# Patient Record
Sex: FEMALE | Race: WHITE
[De-identification: names, ages, dates, MRNs, and addresses within clinical notes are randomized per-mention and may not be internally consistent; named-entity substitution may affect disease eponyms.]

---

## 2020-12-21 PROBLEM — Z00.00 ENCOUNTER FOR PREVENTIVE HEALTH EXAMINATION: Status: ACTIVE | Noted: 2020-12-21

## 2020-12-22 ENCOUNTER — APPOINTMENT (OUTPATIENT)
Dept: ENDOCRINOLOGY | Facility: CLINIC | Age: 30
End: 2020-12-22
Payer: COMMERCIAL

## 2020-12-22 PROCEDURE — 99204 OFFICE O/P NEW MOD 45 MIN: CPT | Mod: 25,95

## 2020-12-25 NOTE — ASSESSMENT
[FreeTextEntry1] : 30 year old female with past medical history of Diabetes Mellitus Type 1 who presents for management of her diabetes\par \par 1. DM 1- controlled, uncomplicated\par Patient counseled on the importance of diabetic control and complications. Patient's diet and the necessary changes discussed. Reviewed over CGMs. Reviewed over glycemic goals. Patient has too many episodes of hypoglycemia. She is at risk for seizures and coma. I suggested that we get a CGM to alarm her when she has hypoglycemia. Also reviewed Glucagon and hypoglycemia treatment.\par \par Cont Humalog 6-8 TID  (CR:7)\par Cont Lantus 18 units QHS\par Cont Ozempic 1 mg Qweekly \par Check fsg QID\par Cont diabetic diet\par Cont exercise\par Will refer to CDE/RD\par will retrieve recent labs\par Opthalmology Visit up to date\par Foot Exam deferred\par \par Follow up in 3 months\par \par \par

## 2020-12-25 NOTE — HISTORY OF PRESENT ILLNESS
[Home] : at home, [unfilled] , at the time of the visit. [Medical Office: ([facility])___] : at the medical office located in  [Verbal consent obtained from patient] : the patient, [unfilled] [FreeTextEntry1] : Ms. SANDRA SKELTON  is a 30 year old female with past medical history of Diabetes Mellitus Type 1 who presents for management of her diabetes. Patient was managed by another endocrinologist for her diabetes. She is in generally good control. She denies any episodes of DKA or hospitalizations. She did have an incident where she had a possible seizure but does not have any memory of it and never sought emergency care. Patient denies any history of diabetic retinopathy, nephropathy or neuropathy. He denies any blurry vision, polyuria, polydipsia and numbness/tingling in the extremities.\par \par \par POCT Glucose: mg/dL\par POCT Hga1c: %\par \par \par Diabetes first diagnosed: 2013\par Type: 2\par \par Current diabetic regimen:\par Humalog 6-8 TID  (CR:7)\par Lantus 18 units QHS\par Ozempic 1 mg Qweekly \par Other relevant medications:\par \par Self monitoring blood glucose : 4x times per day:\par \par SMBG:\par Pre-breakfast: \par Pre-lunch: \par Pre-dinner: 100-140\par bedtime: \par \par Hypoglycemia: after dinner very frequently. Has hypoglycemic unawareness. Symptoms appear when fsg<40\par \par Diet:\par Breakfast: protein powder, soymilk and fruit\par Lunch:\par Dinner: meat with sides\par Snacks: cookies \par \par Exercise:\par \par Urine micro:NA\par lipid profile:NA\par last hba1c:NA\par eye exam: 4/2020\par diabetic foot exam/podiatry:NA\par \par \par \par \par

## 2021-01-05 ENCOUNTER — TRANSCRIPTION ENCOUNTER (OUTPATIENT)
Age: 31
End: 2021-01-05

## 2021-04-23 ENCOUNTER — APPOINTMENT (OUTPATIENT)
Dept: ULTRASOUND IMAGING | Facility: CLINIC | Age: 31
End: 2021-04-23
Payer: COMMERCIAL

## 2021-04-23 ENCOUNTER — APPOINTMENT (OUTPATIENT)
Dept: ENDOCRINOLOGY | Facility: CLINIC | Age: 31
End: 2021-04-23
Payer: COMMERCIAL

## 2021-04-23 ENCOUNTER — OUTPATIENT (OUTPATIENT)
Dept: OUTPATIENT SERVICES | Facility: HOSPITAL | Age: 31
LOS: 1 days | End: 2021-04-23

## 2021-04-23 VITALS
HEART RATE: 91 BPM | WEIGHT: 160 LBS | HEIGHT: 63 IN | DIASTOLIC BLOOD PRESSURE: 82 MMHG | BODY MASS INDEX: 28.35 KG/M2 | OXYGEN SATURATION: 99 % | SYSTOLIC BLOOD PRESSURE: 127 MMHG

## 2021-04-23 DIAGNOSIS — E04.9 NONTOXIC GOITER, UNSPECIFIED: ICD-10-CM

## 2021-04-23 PROCEDURE — 99214 OFFICE O/P EST MOD 30 MIN: CPT | Mod: 25

## 2021-04-23 PROCEDURE — 99072 ADDL SUPL MATRL&STAF TM PHE: CPT

## 2021-04-23 PROCEDURE — 76536 US EXAM OF HEAD AND NECK: CPT | Mod: 26

## 2021-04-26 NOTE — ASSESSMENT
[FreeTextEntry1] : 30 year old female with past medical history of Diabetes Mellitus Type 1 who presents for management of her diabetes\par \par 1. DM 1- controlled, uncomplicated\par Patient counseled on the importance of diabetic control and complications. Patient's diet and the necessary changes discussed. Reviewed over CGMs. Reviewed over glycemic goals. Will check for hypothyroidism and celiac disease given the change in her insulin requirements\par \par Cont Humalog 10-12 TID  (CR:7)\par Cont Lantus 22 units QHS\par Cont Ozempic 1 mg Qweekly \par Check fsg QID\par Cont diabetic diet\par Cont exercise\par labs today\par Opthalmology Visit up to date\par Foot Exam up to date\par \par Follow up in 3 months\par \par \par

## 2021-04-26 NOTE — HISTORY OF PRESENT ILLNESS
[FreeTextEntry1] : Ms. SANDRA SKELTON  is a 30 year old female with past medical history of Diabetes Mellitus Type 1 who presents for management of her diabetes. Patient has been having hyperglycemia and had to increase her insulin. She has also had 2 hypoglycemic episodes. One in which she became unconscious and EMS was called.\par \par \par POCT Glucose: mg/dL\par POCT Hga1c: %\par \par \par Diabetes first diagnosed: 2013\par Type: 1\par \par Current diabetic regimen:\par Humalog 10-12 TID  (CR:7)\par Lantus 22 units QHS\par Ozempic 1 mg Qweekly \par Other relevant medications:\par \par Self monitoring blood glucose : 4x times per day:\par \par SMBG:\par Pre-breakfast: 120-140\par post-meals: 100-200s\par \par Hypoglycemia: after dinner very frequently. Has hypoglycemic unawareness. Symptoms appear when fsg<40\par \par Diet:\par Breakfast: protein powder, soymilk and fruit\par Lunch:\par Dinner: meat with sides\par Snacks: cookies \par \par Exercise:\par \par Urine micro:NA\par lipid profile:NA\par last hba1c:NA\par eye exam: 4/2020\par diabetic foot exam/podiatry:NA\par \par \par \par \par

## 2021-05-02 LAB
CREAT SPEC-SCNC: 106 MG/DL
ENDOMYSIUM IGA SER QL: NEGATIVE
ENDOMYSIUM IGA TITR SER: NORMAL
GLIADIN IGA SER QL: 5.7 UNITS
GLIADIN IGG SER QL: <5 UNITS
GLIADIN PEPTIDE IGA SER-ACNC: NEGATIVE
GLIADIN PEPTIDE IGG SER-ACNC: NEGATIVE
MICROALBUMIN 24H UR DL<=1MG/L-MCNC: 1.4 MG/DL
MICROALBUMIN/CREAT 24H UR-RTO: 13 MG/G
THYROGLOB AB SERPL-ACNC: 29.5 IU/ML
THYROGLOB SERPL-MCNC: 60.8 NG/ML
THYROPEROXIDASE AB SERPL IA-ACNC: 261 IU/ML
TTG IGA SER IA-ACNC: <1.2 U/ML
TTG IGA SER-ACNC: NEGATIVE
TTG IGG SER IA-ACNC: 4.9 U/ML
TTG IGG SER IA-ACNC: NEGATIVE

## 2021-05-05 ENCOUNTER — APPOINTMENT (OUTPATIENT)
Dept: ENDOCRINOLOGY | Facility: CLINIC | Age: 31
End: 2021-05-05
Payer: COMMERCIAL

## 2021-05-05 VITALS — WEIGHT: 165 LBS | HEIGHT: 63 IN | BODY MASS INDEX: 29.23 KG/M2

## 2021-05-05 PROCEDURE — 99072 ADDL SUPL MATRL&STAF TM PHE: CPT

## 2021-05-05 PROCEDURE — 95251 CONT GLUC MNTR ANALYSIS I&R: CPT

## 2021-05-05 PROCEDURE — G0108 DIAB MANAGE TRN  PER INDIV: CPT

## 2021-06-02 ENCOUNTER — APPOINTMENT (OUTPATIENT)
Dept: ENDOCRINOLOGY | Facility: CLINIC | Age: 31
End: 2021-06-02
Payer: COMMERCIAL

## 2021-06-02 VITALS
OXYGEN SATURATION: 99 % | HEIGHT: 63 IN | WEIGHT: 165 LBS | SYSTOLIC BLOOD PRESSURE: 109 MMHG | BODY MASS INDEX: 29.23 KG/M2 | DIASTOLIC BLOOD PRESSURE: 78 MMHG | HEART RATE: 97 BPM

## 2021-06-02 PROCEDURE — 99214 OFFICE O/P EST MOD 30 MIN: CPT

## 2021-06-02 PROCEDURE — 82962 GLUCOSE BLOOD TEST: CPT

## 2021-06-03 NOTE — HISTORY OF PRESENT ILLNESS
[FreeTextEntry1] : Ms. SANDRA SKELTON  is a 30 year old female with past medical history of Diabetes Mellitus Type 1 who presents for management of her diabetes. \par \par \par POCT Glucose: 165 mg/dL\par POCT Hga1c: %\par \par \par Diabetes first diagnosed: 2013\par Type: 1\par \par Current diabetic regimen:\par Humalog 6-8 TID  (CR:7)\par Lantus 18 units QHS\par Ozempic 1 mg Qweekly \par Other relevant medications:\par \par Self monitoring blood glucose :Freestyle Angely\par \par AGP Report: \par (5/6/21-6/2/21)\par High: 10%\par Target (): 68%\par Low: 15%\par Very low: 6%\par \par Hypoglycemia: after dinner very frequently. Has hypoglycemic unawareness. Symptoms appear when fsg<40\par \par Diet:\par Breakfast: protein powder, soymilk and fruit\par Lunch:\par Dinner: meat with sides\par Snacks: cookies \par \par Exercise:\par \par Urine micro:wnl (4/2021)\par lipid profile:LDL 67 (4/2021)\par last hba1c:5.6% (4/2021)\par eye exam: 4/2020\par diabetic foot exam/podiatry: in office (4/2021)\par \par \par \par \par

## 2021-06-03 NOTE — ASSESSMENT
[FreeTextEntry1] : 30 year old female with past medical history of Diabetes Mellitus Type 1 who presents for management of her diabetes\par \par 1. DM 1- controlled, uncomplicated\par Patient counseled on the importance of diabetic control and complications. Patient's diet and the necessary changes discussed. Reviewed over CGMs. Reviewed over glycemic goals. Patient still having a lot of hypoglycemia\par \par Switch to Lyumjev 3-6 units with meals\par Dec to Lantus 16 units\par Cont Ozempic 1 mg Qweekly \par Check fsg QID\par Cont diabetic diet\par Cont exercise\par labs UTD\par Opthalmology Visit up to date\par Foot Exam up to date\par \par 2. Hashimotos\par Euthyroid\par Will cont to monitor\par Follow up in 3 months\par \par \par

## 2021-07-02 ENCOUNTER — NON-APPOINTMENT (OUTPATIENT)
Age: 31
End: 2021-07-02

## 2021-07-19 RX ORDER — INSULIN GLARGINE 100 [IU]/ML
100 INJECTION, SOLUTION SUBCUTANEOUS
Qty: 1 | Refills: 3 | Status: ACTIVE | COMMUNITY
Start: 2021-07-19 | End: 1900-01-01

## 2021-08-24 ENCOUNTER — RESULT REVIEW (OUTPATIENT)
Age: 31
End: 2021-08-24

## 2021-09-15 ENCOUNTER — APPOINTMENT (OUTPATIENT)
Dept: ENDOCRINOLOGY | Facility: CLINIC | Age: 31
End: 2021-09-15
Payer: COMMERCIAL

## 2021-09-15 VITALS
BODY MASS INDEX: 28.88 KG/M2 | SYSTOLIC BLOOD PRESSURE: 112 MMHG | WEIGHT: 163 LBS | HEART RATE: 82 BPM | HEIGHT: 63 IN | DIASTOLIC BLOOD PRESSURE: 72 MMHG | OXYGEN SATURATION: 100 %

## 2021-09-15 PROCEDURE — 99214 OFFICE O/P EST MOD 30 MIN: CPT | Mod: 25

## 2021-09-15 PROCEDURE — 95251 CONT GLUC MNTR ANALYSIS I&R: CPT

## 2021-09-15 RX ORDER — BLOOD-GLUCOSE,RECEIVER,CONT
EACH MISCELLANEOUS
Qty: 1 | Refills: 0 | Status: DISCONTINUED | COMMUNITY
Start: 2021-07-02 | End: 2021-09-15

## 2021-09-17 NOTE — HISTORY OF PRESENT ILLNESS
[FreeTextEntry1] : Ms. SANDRA SKELTON  is a 30 year old female with past medical history of Diabetes Mellitus Type 1 who presents for management of her diabetes. Patient has infection of her breast that is not responding to antibiotics. She also was found to have an abnormal pap smear and is following with Gyn.\par \par \par POCT Glucose: 165 mg/dL\par POCT Hga1c: %\par \par \par Diabetes first diagnosed: 2013\par Type: 1\par \par Current diabetic regimen:\par Humalog 6-8 TID  (CR:7)\par Lantus 18 units QHS\par Ozempic 1 mg Qweekly \par Lyumjev (did not tolerate)\par Other relevant medications:\par \par Self monitoring blood glucose :Freestyle Angely\par \par AGP Report: \par (6/12/21-9/9/21)\par High: 18%\par Target (): 70.3%\par Low: 11.6%\par Very low: 3.6%\par \par Hypoglycemia: after dinner very frequently. Has hypoglycemic unawareness. Symptoms appear when fsg<40\par \par Diet:\par Breakfast: protein powder, soymilk and fruit\par Lunch:\par Dinner: meat with sides\par Snacks: cookies \par \par Exercise:\par \par Urine micro:wnl (4/2021)\par lipid profile:LDL 67 (4/2021)\par last hba1c:5.6% (4/2021)\par eye exam: 4/2020\par diabetic foot exam/podiatry: in office (4/2021)\par \par \par \par \par

## 2021-09-17 NOTE — ASSESSMENT
[FreeTextEntry1] : 30 year old female with past medical history of Diabetes Mellitus Type 1 who presents for management of her diabetes\par \par 1. DM 1- controlled, uncomplicated\par Patient counseled on the importance of diabetic control and complications. Patient's diet and the necessary changes discussed. Reviewed over CGMs. Reviewed over glycemic goals. Patient still having a lot of hypoglycemia\par \par Cont Humalog 6-8 units with meals\par Dec to Lantus 16 units\par Switch from Ozempic to Trulicity\par Check fsg QID\par Cont diabetic diet\par Cont exercise\par labs UTD\par Opthalmology Visit up to date\par Foot Exam up to date\par \par 2. Hashimotos\par Euthyroid\par Will cont to monitor\par \par Follow up in 3 months\par \par \par

## 2021-10-03 ENCOUNTER — TRANSCRIPTION ENCOUNTER (OUTPATIENT)
Age: 31
End: 2021-10-03

## 2021-10-04 ENCOUNTER — NON-APPOINTMENT (OUTPATIENT)
Age: 31
End: 2021-10-04

## 2021-10-06 RX ORDER — PEN NEEDLE, DIABETIC 32GX 5/32"
32G X 4 MM NEEDLE, DISPOSABLE MISCELLANEOUS
Qty: 360 | Refills: 2 | Status: ACTIVE | COMMUNITY
Start: 2021-10-06 | End: 1900-01-01

## 2021-12-22 ENCOUNTER — APPOINTMENT (OUTPATIENT)
Dept: ENDOCRINOLOGY | Facility: CLINIC | Age: 31
End: 2021-12-22
Payer: COMMERCIAL

## 2021-12-22 ENCOUNTER — RESULT CHARGE (OUTPATIENT)
Age: 31
End: 2021-12-22

## 2021-12-22 VITALS
HEART RATE: 82 BPM | HEIGHT: 63 IN | BODY MASS INDEX: 27.46 KG/M2 | OXYGEN SATURATION: 99 % | WEIGHT: 155 LBS | DIASTOLIC BLOOD PRESSURE: 70 MMHG | SYSTOLIC BLOOD PRESSURE: 113 MMHG

## 2021-12-22 PROCEDURE — 99214 OFFICE O/P EST MOD 30 MIN: CPT | Mod: 25

## 2021-12-22 PROCEDURE — 95251 CONT GLUC MNTR ANALYSIS I&R: CPT

## 2021-12-22 PROCEDURE — 82962 GLUCOSE BLOOD TEST: CPT

## 2021-12-22 PROCEDURE — 83036 HEMOGLOBIN GLYCOSYLATED A1C: CPT | Mod: QW

## 2021-12-22 RX ORDER — INSULIN LISPRO-AABC 100 [IU]/ML
100 INJECTION, SOLUTION SUBCUTANEOUS
Qty: 5 | Refills: 1 | Status: DISCONTINUED | COMMUNITY
Start: 2021-06-02 | End: 2021-12-22

## 2021-12-22 RX ORDER — FLASH GLUCOSE SCANNING READER
EACH MISCELLANEOUS
Qty: 1 | Refills: 0 | Status: DISCONTINUED | COMMUNITY
Start: 2021-04-23 | End: 2021-12-22

## 2021-12-22 RX ORDER — FLASH GLUCOSE SENSOR
KIT MISCELLANEOUS
Qty: 6 | Refills: 2 | Status: DISCONTINUED | COMMUNITY
Start: 2021-04-23 | End: 2021-12-22

## 2021-12-22 NOTE — HISTORY OF PRESENT ILLNESS
[FreeTextEntry1] : Ms. SANDRA SKELTON  is a 31 year old female with past medical history of Diabetes Mellitus Type 1 who presents for management of her diabetes. \par \par \par POCT Glucose: 103 mg/dL\par POCT Hga1c: 5.5%\par \par \par Diabetes first diagnosed: 2013\par Type: 1\par \par Current diabetic regimen:\par Humalog 6-8 TID  (CR:7)\par Lantus 18 units QHS\par Ozempic 1 mg Qweekly \par Lyumjev (did not tolerate)\par Other relevant medications:\par \par Self monitoring blood glucose :Dexcom\par \par AGP Report: \par (9/24/21-12/22/21)\par High: 22.5%\par Target (): 67.6%\par Low: 9.9%\par Very low: 2.5%\par \par Hypoglycemia: after dinner very frequently. Has hypoglycemic unawareness. Symptoms appear when fsg<40\par \par Diet:\par Breakfast: protein powder, soymilk and fruit\par Lunch:\par Dinner: meat with sides\par Snacks: cookies \par \par Exercise:\par \par Urine micro:wnl (4/2021)\par lipid profile:LDL 72 (9/2021), LDL 67 (4/2021)\par last hba1c:5.5% (12/2021), 5.7% (9/2021), 5.6% (4/2021)\par eye exam: 4/2020\par diabetic foot exam/podiatry: in office (4/2021)\par \par \par \par \par

## 2021-12-22 NOTE — ASSESSMENT
[FreeTextEntry1] : 31 year old female with past medical history of Diabetes Mellitus Type 1 who presents for management of her diabetes\par \par 1. DM 1- controlled, uncomplicated\par Patient counseled on the importance of diabetic control and complications. Patient's diet and the necessary changes discussed. Reviewed over CGMs. Reviewed over glycemic goals. Patient still having a lot of hypoglycemia\par \par Cont Humalog 6-8 units with meals\par Dec to Lantus 17 units\par Check fsg QID\par Cont diabetic diet\par Cont exercise\par labs UTD\par Opthalmology Visit needs follow up\par Foot Exam up to date\par \par 2. Hashimotos\par Euthyroid\par Will cont to monitor\par \par Follow up in 3 months\par \par \par

## 2022-03-30 ENCOUNTER — RESULT CHARGE (OUTPATIENT)
Age: 32
End: 2022-03-30

## 2022-03-30 ENCOUNTER — APPOINTMENT (OUTPATIENT)
Dept: ENDOCRINOLOGY | Facility: CLINIC | Age: 32
End: 2022-03-30
Payer: COMMERCIAL

## 2022-03-30 VITALS
BODY MASS INDEX: 27.28 KG/M2 | SYSTOLIC BLOOD PRESSURE: 107 MMHG | WEIGHT: 154 LBS | OXYGEN SATURATION: 97 % | HEART RATE: 80 BPM | DIASTOLIC BLOOD PRESSURE: 71 MMHG

## 2022-03-30 LAB
ALBUMIN SERPL ELPH-MCNC: 4.7 G/DL
ALP BLD-CCNC: 68 U/L
ALT SERPL-CCNC: 16 U/L
ANION GAP SERPL CALC-SCNC: 14 MMOL/L
AST SERPL-CCNC: 19 U/L
BASOPHILS # BLD AUTO: 0.06 K/UL
BASOPHILS NFR BLD AUTO: 1.1 %
BILIRUB SERPL-MCNC: 1.1 MG/DL
BUN SERPL-MCNC: 11 MG/DL
CALCIUM SERPL-MCNC: 9.6 MG/DL
CHLORIDE SERPL-SCNC: 105 MMOL/L
CHOLEST SERPL-MCNC: 198 MG/DL
CO2 SERPL-SCNC: 23 MMOL/L
CREAT SERPL-MCNC: 0.79 MG/DL
CREAT SPEC-SCNC: 160 MG/DL
EGFR: 102 ML/MIN/1.73M2
EOSINOPHIL # BLD AUTO: 0.03 K/UL
EOSINOPHIL NFR BLD AUTO: 0.5 %
ESTIMATED AVERAGE GLUCOSE: 126 MG/DL
FRUCTOSAMINE SERPL-MCNC: 317 UMOL/L
GLUCOSE SERPL-MCNC: 47 MG/DL
HBA1C MFR BLD HPLC: 6 %
HCT VFR BLD CALC: 43.6 %
HDLC SERPL-MCNC: 99 MG/DL
HGB BLD-MCNC: 13.8 G/DL
IMM GRANULOCYTES NFR BLD AUTO: 0.2 %
LDLC SERPL CALC-MCNC: 89 MG/DL
LYMPHOCYTES # BLD AUTO: 2.46 K/UL
LYMPHOCYTES NFR BLD AUTO: 44.9 %
MAN DIFF?: NORMAL
MCHC RBC-ENTMCNC: 29.1 PG
MCHC RBC-ENTMCNC: 31.7 GM/DL
MCV RBC AUTO: 91.8 FL
MICROALBUMIN 24H UR DL<=1MG/L-MCNC: 1.5 MG/DL
MICROALBUMIN/CREAT 24H UR-RTO: 10 MG/G
MONOCYTES # BLD AUTO: 0.46 K/UL
MONOCYTES NFR BLD AUTO: 8.4 %
NEUTROPHILS # BLD AUTO: 2.46 K/UL
NEUTROPHILS NFR BLD AUTO: 44.9 %
NONHDLC SERPL-MCNC: 99 MG/DL
PLATELET # BLD AUTO: 284 K/UL
POTASSIUM SERPL-SCNC: 3.8 MMOL/L
PROT SERPL-MCNC: 7.5 G/DL
RBC # BLD: 4.75 M/UL
RBC # FLD: 12 %
SODIUM SERPL-SCNC: 142 MMOL/L
TRIGL SERPL-MCNC: 49 MG/DL
TSH SERPL-ACNC: 1.81 UIU/ML
WBC # FLD AUTO: 5.48 K/UL

## 2022-03-30 PROCEDURE — 83036 HEMOGLOBIN GLYCOSYLATED A1C: CPT | Mod: QW

## 2022-03-30 PROCEDURE — 95251 CONT GLUC MNTR ANALYSIS I&R: CPT

## 2022-03-30 PROCEDURE — 99214 OFFICE O/P EST MOD 30 MIN: CPT | Mod: 25

## 2022-03-30 PROCEDURE — 82962 GLUCOSE BLOOD TEST: CPT

## 2022-04-01 NOTE — PHYSICAL EXAM
[Alert] : alert [Well Nourished] : well nourished [Healthy Appearance] : healthy appearance [No Acute Distress] : no acute distress [Well Developed] : well developed [No Rash] : no rash [Oriented x3] : oriented to person, place, and time

## 2022-04-01 NOTE — ASSESSMENT
[FreeTextEntry1] : 31 year old female with past medical history of Diabetes Mellitus Type 1 who presents for management of her diabetes\par \par 1. DM 1- controlled, uncomplicated\par Patient counseled on the importance of diabetic control and complications. Patient's diet and the necessary changes discussed. Reviewed over CGMs. Reviewed over glycemic goals. Patient still having a lot of hypoglycemia but significantly improved\par \par Cont Humalog 6-8 units with meals\par Cont Semglee 16 units\par Check fsg QID\par Cont diabetic diet\par Cont exercise\par labs UTD\par Opthalmology Visit needs follow up\par Foot Exam up to date\par \par 2. Hashimotos\par Euthyroid\par Will cont to monitor\par \par Follow up in 3 months\par \par \par

## 2022-04-01 NOTE — HISTORY OF PRESENT ILLNESS
[FreeTextEntry1] : Ms. SANDRA SKELTON  is a 31 year old female with past medical history of Diabetes Mellitus Type 1 who presents for management of her diabetes. Patient has less degree of hypoglycemia and she has less awareness.\par \par \par POCT Glucose:77  mg/dL\par POCT Hga1c: 5.8%\par \par \par Diabetes first diagnosed: 2013\par Type: 1\par \par Current diabetic regimen:\par Humalog 6-8 TID  (CR:7)\par Semglee 16 units QHS\par Ozempic 1 mg Qweekly \par Lyumjev (did not tolerate)\par Other relevant medications:\par \par Self monitoring blood glucose :Dexcom\par \par AGP Report: \par (3/1/22-3/30/22)\par High: 22.6%\par Target (): 70.3%\par Low: 7.1%\par Very low: 1.8%\par \par Hypoglycemia: after dinner very frequently. Has hypoglycemic unawareness. Symptoms appear when fsg<40\par \par Diet:\par Breakfast: protein powder, soymilk and fruit\par Lunch:\par Dinner: meat with sides\par Snacks: cookies \par \par Exercise:\par \par Urine micro:wnl (3/2022), wnl (4/2021)\par lipid profile:LDL 89 (3/2022), LDL 72 (9/2021), LDL 67 (4/2021)\par last hba1c:6% (3/2022), 5.5% (12/2021), 5.7% (9/2021), 5.6% (4/2021)\par eye exam: 4/2020\par diabetic foot exam/podiatry: in office (4/2021)\par \par \par \par \par

## 2022-04-18 ENCOUNTER — RESULT REVIEW (OUTPATIENT)
Age: 32
End: 2022-04-18

## 2022-06-30 ENCOUNTER — APPOINTMENT (OUTPATIENT)
Dept: ENDOCRINOLOGY | Facility: CLINIC | Age: 32
End: 2022-06-30

## 2022-06-30 PROCEDURE — 99442: CPT

## 2022-06-30 RX ORDER — GLUCAGON 3 MG/1
3 POWDER NASAL
Qty: 1 | Refills: 2 | Status: ACTIVE | COMMUNITY
Start: 2020-12-22 | End: 1900-01-01

## 2022-06-30 NOTE — HISTORY OF PRESENT ILLNESS
[Home] : at home, [unfilled] , at the time of the visit. [Medical Office: ([facility])___] : at the medical office located in  [Verbal consent obtained from patient] : the patient, [unfilled] [FreeTextEntry1] : Ms. SANDRA SKELTON  is a 31 year old female with past medical history of Diabetes Mellitus Type 1 who presents for management of her diabetes. Patient has less degree of hypoglycemia and she has less awareness.\par \par \par POCT Glucose:77  mg/dL\par POCT Hga1c: 5.8%\par \par \par Diabetes first diagnosed: 2013\par Type: 1\par \par Current diabetic regimen:\par Humalog 6-8 TID  (CR:7)\par Semglee 18 units QHS\par Ozempic 1 mg Qweekly \par Lyumjev (did not tolerate)\par Other relevant medications:\par \par Self monitoring blood glucose :Dexcom\par \par AGP Report: \par (3/1/22-3/30/22)\par High: 22.6%\par Target (): 70.3%\par Low: 7.1%\par Very low: 1.8%\par \par Hypoglycemia: after dinner very frequently. Has hypoglycemic unawareness. Symptoms appear when fsg<40\par \par Diet:\par Breakfast: protein powder, soymilk and fruit\par Lunch:\par Dinner: meat with sides\par Snacks: cookies \par \par Exercise:\par \par Urine micro:wnl (3/2022), wnl (4/2021)\par lipid profile:LDL 89 (3/2022), LDL 72 (9/2021), LDL 67 (4/2021)\par last hba1c:6% (3/2022), 5.5% (12/2021), 5.7% (9/2021), 5.6% (4/2021)\par eye exam: 4/2020\par diabetic foot exam/podiatry: in office (4/2021)\par \par \par \par \par

## 2022-07-17 ENCOUNTER — RX RENEWAL (OUTPATIENT)
Age: 32
End: 2022-07-17

## 2022-07-20 ENCOUNTER — RX RENEWAL (OUTPATIENT)
Age: 32
End: 2022-07-20

## 2022-10-18 ENCOUNTER — RX RENEWAL (OUTPATIENT)
Age: 32
End: 2022-10-18

## 2022-10-19 ENCOUNTER — APPOINTMENT (OUTPATIENT)
Dept: ENDOCRINOLOGY | Facility: CLINIC | Age: 32
End: 2022-10-19

## 2022-10-19 VITALS
HEIGHT: 63 IN | BODY MASS INDEX: 29.77 KG/M2 | OXYGEN SATURATION: 97 % | HEART RATE: 83 BPM | SYSTOLIC BLOOD PRESSURE: 108 MMHG | DIASTOLIC BLOOD PRESSURE: 69 MMHG | WEIGHT: 168 LBS

## 2022-10-19 PROCEDURE — 99214 OFFICE O/P EST MOD 30 MIN: CPT | Mod: 25

## 2022-10-19 PROCEDURE — 95251 CONT GLUC MNTR ANALYSIS I&R: CPT

## 2022-10-20 NOTE — HISTORY OF PRESENT ILLNESS
[FreeTextEntry1] : Ms. SANDRA SKELTON  is a 32 year old female with past medical history of Diabetes Mellitus Type 1 who presents for management of her diabetes. Patient has less degree of hypoglycemia and she has less awareness.\par \par \par POCT Glucose:  mg/dL\par POCT Hga1c: %\par \par \par Diabetes first diagnosed: 2013\par Type: 1\par \par Current diabetic regimen:\par Humalog 6-8 TID  (CR:7)\par Semglee 16 units QHS\par Ozempic 1 mg Qweekly \par Lyumjev (did not tolerate)\par Other relevant medications:\par \par Self monitoring blood glucose :Dexcom\par \par AGP Report: \par (9/19/22-10/18/22)\par High: 24.3%\par Target ():67%\par Low: 8.6%\par Very low: 1.7%\par \par Hypoglycemia: after dinner very frequently. Has hypoglycemic unawareness. Symptoms appear when fsg<40\par \par Diet:\par Breakfast: protein powder, soymilk and fruit\par Lunch:\par Dinner: meat with sides\par Snacks: cookies \par \par Exercise:\par \par Urine micro:wnl (3/2022), wnl (4/2021)\par lipid profile:LDL 89 (3/2022), LDL 72 (9/2021), LDL 67 (4/2021)\par last hba1c:6% (3/2022), 5.5% (12/2021), 5.7% (9/2021), 5.6% (4/2021)\par eye exam: 2022\par diabetic foot exam/podiatry: in office (4/2021)\par \par \par \par \par

## 2022-10-20 NOTE — ASSESSMENT
[FreeTextEntry1] : 32 year old female with past medical history of Diabetes Mellitus Type 1 who presents for management of her diabetes\par \par 1. DM 1- controlled, uncomplicated\par Patient counseled on the importance of diabetic control and complications. Patient's diet and the necessary changes discussed. Reviewed over CGMs. Reviewed over glycemic goals. Patient still having a lot of hypoglycemia but significantly improved\par \par Cont Humalog 6-8 units with meals\par Cont Semglee 16 units\par COnt Ozempic 1 mg Qweekly\par Check fsg QID\par Cont diabetic diet\par Cont exercise\par labs due\par Opthalmology Visit needs follow up\par Foot Exam up to date\par \par 2. Hashimotos\par Euthyroid\par Will cont to monitor\par \par Follow up in 3 months\par \par \par

## 2022-11-01 LAB
ALBUMIN SERPL ELPH-MCNC: 4.1 G/DL
ALP BLD-CCNC: 75 U/L
ALT SERPL-CCNC: 10 U/L
ANION GAP SERPL CALC-SCNC: 15 MMOL/L
AST SERPL-CCNC: 13 U/L
BASOPHILS # BLD AUTO: 0.08 K/UL
BASOPHILS NFR BLD AUTO: 1.1 %
BILIRUB SERPL-MCNC: 1.4 MG/DL
BUN SERPL-MCNC: 13 MG/DL
CALCIUM SERPL-MCNC: 9 MG/DL
CHLORIDE SERPL-SCNC: 104 MMOL/L
CHOLEST SERPL-MCNC: 198 MG/DL
CO2 SERPL-SCNC: 20 MMOL/L
CREAT SERPL-MCNC: 0.88 MG/DL
CREAT SPEC-SCNC: 164 MG/DL
EGFR: 89 ML/MIN/1.73M2
EOSINOPHIL # BLD AUTO: 0.1 K/UL
EOSINOPHIL NFR BLD AUTO: 1.4 %
ESTIMATED AVERAGE GLUCOSE: 128 MG/DL
FOLATE SERPL-MCNC: 15.5 NG/ML
GLUCOSE SERPL-MCNC: 167 MG/DL
HBA1C MFR BLD HPLC: 6.1 %
HCT VFR BLD CALC: 43.8 %
HDLC SERPL-MCNC: 85 MG/DL
HGB BLD-MCNC: 14 G/DL
IMM GRANULOCYTES NFR BLD AUTO: 0.3 %
LDLC SERPL CALC-MCNC: 100 MG/DL
LYMPHOCYTES # BLD AUTO: 1.89 K/UL
LYMPHOCYTES NFR BLD AUTO: 26.7 %
MAN DIFF?: NORMAL
MCHC RBC-ENTMCNC: 30.2 PG
MCHC RBC-ENTMCNC: 32 GM/DL
MCV RBC AUTO: 94.4 FL
MICROALBUMIN 24H UR DL<=1MG/L-MCNC: 1.5 MG/DL
MICROALBUMIN/CREAT 24H UR-RTO: 9 MG/G
MONOCYTES # BLD AUTO: 0.49 K/UL
MONOCYTES NFR BLD AUTO: 6.9 %
NEUTROPHILS # BLD AUTO: 4.5 K/UL
NEUTROPHILS NFR BLD AUTO: 63.6 %
NONHDLC SERPL-MCNC: 113 MG/DL
PLATELET # BLD AUTO: 343 K/UL
POTASSIUM SERPL-SCNC: 4.6 MMOL/L
PROT SERPL-MCNC: 7.1 G/DL
RBC # BLD: 4.64 M/UL
RBC # FLD: 12.1 %
SODIUM SERPL-SCNC: 139 MMOL/L
TRIGL SERPL-MCNC: 65 MG/DL
TSH SERPL-ACNC: 0.72 UIU/ML
VIT B12 SERPL-MCNC: 392 PG/ML
WBC # FLD AUTO: 7.08 K/UL

## 2022-11-03 ENCOUNTER — NON-APPOINTMENT (OUTPATIENT)
Age: 32
End: 2022-11-03

## 2022-11-28 RX ORDER — BLOOD SUGAR DIAGNOSTIC
STRIP MISCELLANEOUS
Qty: 4 | Refills: 2 | Status: ACTIVE | COMMUNITY
Start: 2021-04-01 | End: 1900-01-01

## 2023-01-11 ENCOUNTER — APPOINTMENT (OUTPATIENT)
Dept: ENDOCRINOLOGY | Facility: CLINIC | Age: 33
End: 2023-01-11
Payer: COMMERCIAL

## 2023-01-11 VITALS — HEART RATE: 76 BPM | SYSTOLIC BLOOD PRESSURE: 121 MMHG | OXYGEN SATURATION: 100 % | DIASTOLIC BLOOD PRESSURE: 77 MMHG

## 2023-01-11 PROCEDURE — 82962 GLUCOSE BLOOD TEST: CPT

## 2023-01-11 PROCEDURE — 99214 OFFICE O/P EST MOD 30 MIN: CPT | Mod: 25

## 2023-01-12 ENCOUNTER — RESULT CHARGE (OUTPATIENT)
Age: 33
End: 2023-01-12

## 2023-01-12 NOTE — HISTORY OF PRESENT ILLNESS
[FreeTextEntry1] : Ms. SANDRA SKELTON  is a 32 year old female with past medical history of Diabetes Mellitus Type 1 who presents for management of her diabetes. Patient has been bolusing less to avoid hypoglycemia but then she becomes hyperglycemic. She has been exercising but not losing weight. \par \par \par POCT Glucose:54  mg/dL\par POCT Hga1c:6 %\par \par \par Diabetes first diagnosed: 2013\par Type: 1\par \par Current diabetic regimen:\par Humalog 6-8 TID  (CR:7)\par Semglee 16 units QHS\par Ozempic 1 mg Qweekly \par Lyumjev (did not tolerate)\par Other relevant medications:\par \par Self monitoring blood glucose :Dexcom\par \par AGP Report: \par (12/13/22-1/11/23)\par High: 20.3%\par Target ():70.6%\par Low: 9.1%\par Very low: 2.2%\par \par Hypoglycemia: after dinner very frequently. Has hypoglycemic unawareness. Symptoms appear when fsg<40\par \par Diet:\par Breakfast: protein powder, soymilk and fruit\par Lunch:\par Dinner: meat with sides\par Snacks: cookies \par \par Exercise:\par \par Urine micro:wnl (1/2023)wnl (3/2022), wnl (4/2021)\par lipid profile: (1/2023), LDL 89 (3/2022), LDL 72 (9/2021), LDL 67 (4/2021)\par last hba1c:6% (3/2022), 5.5% (12/2021), 5.7% (9/2021), 5.6% (4/2021)\par eye exam: 2022\par diabetic foot exam/podiatry: in office (10/2022)\par \par \par \par \par

## 2023-01-12 NOTE — ASSESSMENT
[FreeTextEntry1] : 32 year old female with past medical history of Diabetes Mellitus Type 1 who presents for management of her diabetes\par \par 1. DM 1- controlled, uncomplicated\par Patient counseled on the importance of diabetic control and complications. Patient's diet and the necessary changes discussed. Reviewed over CGMs. Reviewed over glycemic goals. Patient still having a lot of hypoglycemia but significantly improved\par \par Cont Humalog 6-8 units with meals\par Cont Semglee 16 units\par COnt Ozempic 1 mg Qweekly\par Patient needs more education on carb counting\par refer to CDE\par Check fsg QID\par Cont diabetic diet\par Cont exercise\par labs up to date\par Opthalmology Visit needs follow up\par Foot Exam up to date\par \par 2. Hashimotos\par Euthyroid\par Will cont to monitor\par \par Follow up in 3 months\par \par \par

## 2023-02-01 ENCOUNTER — APPOINTMENT (OUTPATIENT)
Dept: ENDOCRINOLOGY | Facility: CLINIC | Age: 33
End: 2023-02-01
Payer: COMMERCIAL

## 2023-02-01 PROCEDURE — G0108 DIAB MANAGE TRN  PER INDIV: CPT

## 2023-02-15 ENCOUNTER — RX RENEWAL (OUTPATIENT)
Age: 33
End: 2023-02-15

## 2023-04-12 ENCOUNTER — APPOINTMENT (OUTPATIENT)
Dept: ENDOCRINOLOGY | Facility: CLINIC | Age: 33
End: 2023-04-12
Payer: COMMERCIAL

## 2023-04-12 VITALS
HEIGHT: 63 IN | OXYGEN SATURATION: 99 % | WEIGHT: 158 LBS | SYSTOLIC BLOOD PRESSURE: 115 MMHG | DIASTOLIC BLOOD PRESSURE: 76 MMHG | BODY MASS INDEX: 28 KG/M2 | HEART RATE: 82 BPM

## 2023-04-12 PROCEDURE — 95251 CONT GLUC MNTR ANALYSIS I&R: CPT

## 2023-04-12 PROCEDURE — 99214 OFFICE O/P EST MOD 30 MIN: CPT | Mod: 25

## 2023-04-13 NOTE — ASSESSMENT
[FreeTextEntry1] : 32 year old female with past medical history of Diabetes Mellitus Type 1 who presents for management of her diabetes\par \par 1. DM 1- controlled, uncomplicated\par Patient counseled on the importance of diabetic control and complications. Patient's diet and the necessary changes discussed. Reviewed over CGMs. Reviewed over glycemic goals. Patient still having a lot of hypoglycemia but significantly improved\par \par Cont Humalog 6-8 units with meals\par Cont Semglee 16 units\par Cont Ozempic 1 mg Qweekly\par Patient needs more education on carb counting\par Check fsg QID\par Cont diabetic diet\par Cont exercise\par labs ordered\par Opthalmology Visit needs follow up\par Foot Exam up to date\par \par 2. Hashimotos\par Euthyroid\par Will cont to monitor\par \par Follow up in 5 months\par \par \par

## 2023-04-13 NOTE — HISTORY OF PRESENT ILLNESS
[FreeTextEntry1] : Ms. SANDRA SKELTON  is a 32 year old female with past medical history of Diabetes Mellitus Type 1 who presents for management of her diabetes. \par \par \par POCT Glucose:mg/dL\par POCT Hga1c:%\par \par \par Diabetes first diagnosed: 2013\par Type: 1\par \par Current diabetic regimen:\par Humalog 6-8 TID  (CR:7)\par Semglee 16 units QHS\par Ozempic 1 mg Qweekly \par Lyumjev (did not tolerate)\par Other relevant medications:\par \par Self monitoring blood glucose :Dexcom\par \par AGP Report: \par (3/30/23-4/12/23)\par High: 8%\par Target ():76%\par Low: 9%\par Very low: 3%\par \par Hypoglycemia: after dinner very frequently. Has hypoglycemic unawareness. Symptoms appear when fsg<40\par \par Diet:\par Breakfast: protein powder, soymilk and fruit\par Lunch:\par Dinner: meat with sides\par Snacks: cookies \par \par Exercise:\par \par Urine micro:wnl (1/2023)wnl (3/2022), wnl (4/2021)\par lipid profile: (1/2023), LDL 89 (3/2022), LDL 72 (9/2021), LDL 67 (4/2021)\par last hba1c:6% (3/2022), 5.5% (12/2021), 5.7% (9/2021), 5.6% (4/2021)\par eye exam: 2022\par diabetic foot exam/podiatry: in office (10/2022)\par \par \par \par \par

## 2023-04-17 LAB
ALBUMIN SERPL ELPH-MCNC: 4.2 G/DL
ALP BLD-CCNC: 57 U/L
ALT SERPL-CCNC: 10 U/L
ANION GAP SERPL CALC-SCNC: 12 MMOL/L
AST SERPL-CCNC: 14 U/L
BASOPHILS # BLD AUTO: 0.05 K/UL
BASOPHILS NFR BLD AUTO: 1.4 %
BILIRUB SERPL-MCNC: 0.8 MG/DL
BUN SERPL-MCNC: 8 MG/DL
CALCIUM SERPL-MCNC: 9.4 MG/DL
CHLORIDE SERPL-SCNC: 103 MMOL/L
CHOLEST SERPL-MCNC: 181 MG/DL
CO2 SERPL-SCNC: 25 MMOL/L
CREAT SERPL-MCNC: 0.85 MG/DL
CREAT SPEC-SCNC: 130 MG/DL
EGFR: 93 ML/MIN/1.73M2
EOSINOPHIL # BLD AUTO: 0.09 K/UL
EOSINOPHIL NFR BLD AUTO: 2.4 %
ESTIMATED AVERAGE GLUCOSE: 126 MG/DL
FOLATE SERPL-MCNC: 9.6 NG/ML
FRUCTOSAMINE SERPL-MCNC: 279 UMOL/L
GLUCOSE SERPL-MCNC: 125 MG/DL
HBA1C MFR BLD HPLC: 6 %
HCT VFR BLD CALC: 45.8 %
HDLC SERPL-MCNC: 80 MG/DL
HGB BLD-MCNC: 14 G/DL
IMM GRANULOCYTES NFR BLD AUTO: 0.3 %
LDLC SERPL CALC-MCNC: 89 MG/DL
LYMPHOCYTES # BLD AUTO: 1.91 K/UL
LYMPHOCYTES NFR BLD AUTO: 51.9 %
MAN DIFF?: NORMAL
MCHC RBC-ENTMCNC: 29.2 PG
MCHC RBC-ENTMCNC: 30.6 GM/DL
MCV RBC AUTO: 95.4 FL
MICROALBUMIN 24H UR DL<=1MG/L-MCNC: <1.2 MG/DL
MICROALBUMIN/CREAT 24H UR-RTO: NORMAL MG/G
MONOCYTES # BLD AUTO: 0.28 K/UL
MONOCYTES NFR BLD AUTO: 7.6 %
NEUTROPHILS # BLD AUTO: 1.34 K/UL
NEUTROPHILS NFR BLD AUTO: 36.4 %
NONHDLC SERPL-MCNC: 101 MG/DL
PLATELET # BLD AUTO: 279 K/UL
POTASSIUM SERPL-SCNC: 5.1 MMOL/L
PROT SERPL-MCNC: 6.7 G/DL
RBC # BLD: 4.8 M/UL
RBC # FLD: 13.1 %
SODIUM SERPL-SCNC: 140 MMOL/L
TRIGL SERPL-MCNC: 63 MG/DL
TSH SERPL-ACNC: 1.29 UIU/ML
VIT B12 SERPL-MCNC: 319 PG/ML
WBC # FLD AUTO: 3.68 K/UL

## 2023-04-21 ENCOUNTER — NON-APPOINTMENT (OUTPATIENT)
Age: 33
End: 2023-04-21

## 2023-08-01 ENCOUNTER — RX RENEWAL (OUTPATIENT)
Age: 33
End: 2023-08-01

## 2023-08-01 RX ORDER — SEMAGLUTIDE 1.34 MG/ML
4 INJECTION, SOLUTION SUBCUTANEOUS
Qty: 9 | Refills: 0 | Status: ACTIVE | COMMUNITY
Start: 2022-06-30 | End: 1900-01-01

## 2023-09-01 ENCOUNTER — RX RENEWAL (OUTPATIENT)
Age: 33
End: 2023-09-01

## 2023-09-01 RX ORDER — BLOOD-GLUCOSE SENSOR
EACH MISCELLANEOUS
Qty: 9 | Refills: 2 | Status: ACTIVE | COMMUNITY
Start: 2022-07-15 | End: 1900-01-01

## 2023-09-25 ENCOUNTER — APPOINTMENT (OUTPATIENT)
Dept: ENDOCRINOLOGY | Facility: CLINIC | Age: 33
End: 2023-09-25

## 2023-10-02 ENCOUNTER — APPOINTMENT (OUTPATIENT)
Dept: ENDOCRINOLOGY | Facility: CLINIC | Age: 33
End: 2023-10-02
Payer: COMMERCIAL

## 2023-10-02 DIAGNOSIS — E06.3 AUTOIMMUNE THYROIDITIS: ICD-10-CM

## 2023-10-02 PROCEDURE — 99443: CPT

## 2023-10-02 PROCEDURE — 95251 CONT GLUC MNTR ANALYSIS I&R: CPT

## 2023-10-02 RX ORDER — SEMAGLUTIDE 2.68 MG/ML
8 INJECTION, SOLUTION SUBCUTANEOUS
Qty: 1 | Refills: 3 | Status: DISCONTINUED | COMMUNITY
Start: 2023-02-01 | End: 2023-10-02

## 2023-10-02 RX ORDER — DULAGLUTIDE 0.75 MG/.5ML
0.75 INJECTION, SOLUTION SUBCUTANEOUS
Qty: 3 | Refills: 2 | Status: DISCONTINUED | COMMUNITY
Start: 2021-09-15 | End: 2023-10-02

## 2024-02-13 ENCOUNTER — RX RENEWAL (OUTPATIENT)
Age: 34
End: 2024-02-13

## 2024-02-13 RX ORDER — INSULIN GLARGINE-YFGN 100 [IU]/ML
100 INJECTION, SOLUTION SUBCUTANEOUS
Qty: 15 | Refills: 3 | Status: ACTIVE | COMMUNITY
Start: 2022-07-20 | End: 1900-01-01

## 2024-02-15 ENCOUNTER — RX RENEWAL (OUTPATIENT)
Age: 34
End: 2024-02-15

## 2024-02-15 RX ORDER — INSULIN LISPRO 100 [IU]/ML
100 INJECTION, SOLUTION INTRAVENOUS; SUBCUTANEOUS
Qty: 45 | Refills: 3 | Status: ACTIVE | COMMUNITY
Start: 2020-12-22 | End: 1900-01-01

## 2024-02-27 ENCOUNTER — APPOINTMENT (OUTPATIENT)
Dept: ENDOCRINOLOGY | Facility: CLINIC | Age: 34
End: 2024-02-27
Payer: COMMERCIAL

## 2024-02-27 VITALS
HEIGHT: 63 IN | BODY MASS INDEX: 31.01 KG/M2 | SYSTOLIC BLOOD PRESSURE: 95 MMHG | WEIGHT: 175 LBS | DIASTOLIC BLOOD PRESSURE: 68 MMHG | OXYGEN SATURATION: 100 % | HEART RATE: 75 BPM

## 2024-02-27 DIAGNOSIS — R60.0 LOCALIZED EDEMA: ICD-10-CM

## 2024-02-27 DIAGNOSIS — E10.9 TYPE 1 DIABETES MELLITUS W/OUT COMPLICATIONS: ICD-10-CM

## 2024-02-27 DIAGNOSIS — E66.9 OBESITY, UNSPECIFIED: ICD-10-CM

## 2024-02-27 PROCEDURE — 95251 CONT GLUC MNTR ANALYSIS I&R: CPT

## 2024-02-27 PROCEDURE — 99215 OFFICE O/P EST HI 40 MIN: CPT | Mod: 25

## 2024-02-27 PROCEDURE — 82962 GLUCOSE BLOOD TEST: CPT

## 2024-02-27 RX ORDER — SEMAGLUTIDE 1.7 MG/.75ML
1.7 INJECTION, SOLUTION SUBCUTANEOUS
Qty: 1 | Refills: 3 | Status: ACTIVE | COMMUNITY
Start: 2024-02-27 | End: 1900-01-01

## 2024-02-28 NOTE — PHYSICAL EXAM
[Alert] : alert [No Acute Distress] : no acute distress [Well Developed] : well developed [Normal Voice/Communication] : normal voice communication [No Rash] : no rash [Right foot was examined, including] : right foot ~C was examined, including visual inspection with sensory and pulse exams [Left foot was examined, including] : left foot ~C was examined, including visual inspection with sensory and pulse exams [Full ROM] : with full range of motion [Normal] : normal [Oriented x3] : oriented to person, place, and time [Diminished Throughout Both Feet] : normal tactile sensation with monofilament testing throughout both feet

## 2024-02-29 ENCOUNTER — RESULT CHARGE (OUTPATIENT)
Age: 34
End: 2024-02-29

## 2024-02-29 NOTE — HISTORY OF PRESENT ILLNESS
[FreeTextEntry1] : Ms. SANDRA SKELTON  is a 33 year old female with past medical history of Diabetes Mellitus Type 1, Lipedema, Hashimotos who presents for management of her diabetes. Patient was on vacation last week and had higher blood sugars.   POCT Glucose:mg/dL POCT Hga1c:%   Diabetes first diagnosed: 2013 Type: 1  Current diabetic regimen: Humalog 6-8 TID  (CR:7) Semglee 13 units QHS Ozempic 1 mg Qweekly (not covered) Lyumjev (did not tolerate) Other relevant medications:  Self monitoring blood glucose :Dexcom  AGP Report:  (11/30/23-2/27/24) High: 27% Target ():63% Low: 7% Very low: 3%  Hypoglycemia: after dinner very frequently. Has hypoglycemic unawareness. Symptoms appear when fsg<40  Diet: Breakfast: protein powder, soymilk and fruit Lunch: Dinner: meat with sides Snacks: cookies   Exercise:  Urine micro:wnl (1/2023)wnl (3/2022), wnl (4/2021) lipid profile: (1/2023), LDL 89 (3/2022), LDL 72 (9/2021), LDL 67 (4/2021) last hba1c:6% (3/2022), 5.5% (12/2021), 5.7% (9/2021), 5.6% (4/2021) eye exam: 2022 diabetic foot exam/podiatry: in office (4/2023)

## 2024-02-29 NOTE — ASSESSMENT
[FreeTextEntry1] : 33 year old female with past medical history of Diabetes Mellitus Type 1 who presents for management of her diabetes  1. DM 1- controlled, uncomplicated Patient counseled on the importance of diabetic control and complications. Patient's diet and the necessary changes discussed. Reviewed over CGMs. Reviewed over glycemic goals. Patient still having a lot of hypoglycemia but significantly improved  Cont Humalog 6-8 units with meals Cont Semglee 16 units Start Metformin [id number] mg QD Patient needs more education on carb counting Check fsg QID Cont diabetic diet Cont exercise labs ordered for follow up Opthalmology Visit needs follow up Foot Exam up to date  2. Hashimotos Euthyroid Will cont to monitor  Follow up in 5 months

## 2024-04-15 ENCOUNTER — RX RENEWAL (OUTPATIENT)
Age: 34
End: 2024-04-15

## 2024-04-15 RX ORDER — BLOOD-GLUCOSE TRANSMITTER
EACH MISCELLANEOUS
Qty: 1 | Refills: 3 | Status: ACTIVE | COMMUNITY
Start: 2021-07-02 | End: 1900-01-01

## 2024-05-22 RX ORDER — METFORMIN ER 500 MG 500 MG/1
500 TABLET ORAL DAILY
Qty: 2 | Refills: 2 | Status: ACTIVE | COMMUNITY
Start: 2024-02-27 | End: 1900-01-01

## 2024-06-13 ENCOUNTER — RX RENEWAL (OUTPATIENT)
Age: 34
End: 2024-06-13

## 2024-06-13 RX ORDER — BLOOD-GLUCOSE SENSOR
EACH MISCELLANEOUS
Qty: 3 | Refills: 3 | Status: ACTIVE | COMMUNITY
Start: 2021-07-02 | End: 1900-01-01

## 2024-06-29 LAB
ALBUMIN SERPL ELPH-MCNC: 4.2 G/DL
ALP BLD-CCNC: 82 U/L
ALT SERPL-CCNC: 16 U/L
ANION GAP SERPL CALC-SCNC: 13 MMOL/L
AST SERPL-CCNC: 15 U/L
BILIRUB SERPL-MCNC: 1 MG/DL
BUN SERPL-MCNC: 10 MG/DL
CALCIUM SERPL-MCNC: 9.8 MG/DL
CHLORIDE SERPL-SCNC: 104 MMOL/L
CHOLEST SERPL-MCNC: 222 MG/DL
CO2 SERPL-SCNC: 25 MMOL/L
CREAT SERPL-MCNC: 0.84 MG/DL
CREAT SPEC-SCNC: 127 MG/DL
EGFR: 93 ML/MIN/1.73M2
ESTIMATED AVERAGE GLUCOSE: 137 MG/DL
FOLATE SERPL-MCNC: 10 NG/ML
FRUCTOSAMINE SERPL-MCNC: 311 UMOL/L
GLUCOSE SERPL-MCNC: 128 MG/DL
HBA1C MFR BLD HPLC: 6.4 %
HCT VFR BLD CALC: 45.9 %
HDLC SERPL-MCNC: 113 MG/DL
HGB BLD-MCNC: 14.3 G/DL
LDLC SERPL CALC-MCNC: 98 MG/DL
MCHC RBC-ENTMCNC: 29.7 PG
MCHC RBC-ENTMCNC: 31.2 GM/DL
MCV RBC AUTO: 95.2 FL
MICROALBUMIN 24H UR DL<=1MG/L-MCNC: <1.2 MG/DL
MICROALBUMIN/CREAT 24H UR-RTO: NORMAL MG/G
NONHDLC SERPL-MCNC: 110 MG/DL
PLATELET # BLD AUTO: 283 K/UL
POTASSIUM SERPL-SCNC: 4.3 MMOL/L
PROT SERPL-MCNC: 7 G/DL
RBC # BLD: 4.82 M/UL
RBC # FLD: 12.3 %
SODIUM SERPL-SCNC: 142 MMOL/L
TRIGL SERPL-MCNC: 65 MG/DL
TSH SERPL-ACNC: 0.87 UIU/ML
VIT B12 SERPL-MCNC: 382 PG/ML
WBC # FLD AUTO: 5.58 K/UL

## 2024-07-03 ENCOUNTER — APPOINTMENT (OUTPATIENT)
Dept: ENDOCRINOLOGY | Facility: CLINIC | Age: 34
End: 2024-07-03
Payer: COMMERCIAL

## 2024-07-03 VITALS
OXYGEN SATURATION: 100 % | HEIGHT: 63 IN | SYSTOLIC BLOOD PRESSURE: 103 MMHG | BODY MASS INDEX: 31.89 KG/M2 | DIASTOLIC BLOOD PRESSURE: 70 MMHG | HEART RATE: 89 BPM | WEIGHT: 180 LBS

## 2024-07-03 DIAGNOSIS — E10.9 TYPE 1 DIABETES MELLITUS W/OUT COMPLICATIONS: ICD-10-CM

## 2024-07-03 DIAGNOSIS — E66.9 OBESITY, UNSPECIFIED: ICD-10-CM

## 2024-07-03 DIAGNOSIS — E06.3 AUTOIMMUNE THYROIDITIS: ICD-10-CM

## 2024-07-03 PROCEDURE — G2211 COMPLEX E/M VISIT ADD ON: CPT | Mod: NC

## 2024-07-03 PROCEDURE — 99214 OFFICE O/P EST MOD 30 MIN: CPT

## 2024-07-24 ENCOUNTER — RX RENEWAL (OUTPATIENT)
Age: 34
End: 2024-07-24

## 2024-11-20 ENCOUNTER — APPOINTMENT (OUTPATIENT)
Dept: ENDOCRINOLOGY | Facility: CLINIC | Age: 34
End: 2024-11-20

## 2025-02-06 ENCOUNTER — APPOINTMENT (OUTPATIENT)
Dept: ENDOCRINOLOGY | Facility: CLINIC | Age: 35
End: 2025-02-06
Payer: COMMERCIAL

## 2025-02-06 VITALS
WEIGHT: 153 LBS | DIASTOLIC BLOOD PRESSURE: 70 MMHG | BODY MASS INDEX: 27.11 KG/M2 | SYSTOLIC BLOOD PRESSURE: 100 MMHG | HEART RATE: 78 BPM | OXYGEN SATURATION: 99 % | HEIGHT: 63 IN

## 2025-02-06 DIAGNOSIS — E06.3 AUTOIMMUNE THYROIDITIS: ICD-10-CM

## 2025-02-06 DIAGNOSIS — E10.9 TYPE 1 DIABETES MELLITUS W/OUT COMPLICATIONS: ICD-10-CM

## 2025-02-06 DIAGNOSIS — E66.811 OBESITY, CLASS 1: ICD-10-CM

## 2025-02-06 PROCEDURE — 95251 CONT GLUC MNTR ANALYSIS I&R: CPT

## 2025-02-06 PROCEDURE — 99214 OFFICE O/P EST MOD 30 MIN: CPT

## 2025-02-06 PROCEDURE — 83036 HEMOGLOBIN GLYCOSYLATED A1C: CPT | Mod: QW

## 2025-02-06 RX ORDER — FLUOXETINE HYDROCHLORIDE 40 MG/1
40 CAPSULE ORAL
Refills: 0 | Status: ACTIVE | COMMUNITY

## 2025-02-06 RX ORDER — FLUOXETINE HYDROCHLORIDE 20 MG/1
20 CAPSULE ORAL
Refills: 0 | Status: ACTIVE | COMMUNITY

## 2025-03-05 ENCOUNTER — RX RENEWAL (OUTPATIENT)
Age: 35
End: 2025-03-05

## 2025-05-06 RX ORDER — INSULIN GLARGINE 100 [IU]/ML
100 INJECTION, SOLUTION SUBCUTANEOUS
Qty: 3 | Refills: 0 | Status: ACTIVE | COMMUNITY
Start: 2025-05-06 | End: 1900-01-01

## 2025-05-30 ENCOUNTER — RX RENEWAL (OUTPATIENT)
Age: 35
End: 2025-05-30

## 2025-05-30 RX ORDER — INSULIN DEGLUDEC INJECTION 100 U/ML
100 INJECTION, SOLUTION SUBCUTANEOUS
Qty: 1 | Refills: 2 | Status: ACTIVE | COMMUNITY
Start: 2025-05-30 | End: 1900-01-01

## 2025-06-24 ENCOUNTER — APPOINTMENT (OUTPATIENT)
Dept: ENDOCRINOLOGY | Facility: CLINIC | Age: 35
End: 2025-06-24
Payer: COMMERCIAL

## 2025-06-24 VITALS
HEIGHT: 63 IN | SYSTOLIC BLOOD PRESSURE: 105 MMHG | HEART RATE: 89 BPM | BODY MASS INDEX: 28.35 KG/M2 | DIASTOLIC BLOOD PRESSURE: 68 MMHG | WEIGHT: 160 LBS | OXYGEN SATURATION: 100 %

## 2025-06-24 PROCEDURE — 83036 HEMOGLOBIN GLYCOSYLATED A1C: CPT | Mod: QW

## 2025-06-24 PROCEDURE — 99214 OFFICE O/P EST MOD 30 MIN: CPT

## 2025-06-24 PROCEDURE — 95251 CONT GLUC MNTR ANALYSIS I&R: CPT

## 2025-06-24 PROCEDURE — G2211 COMPLEX E/M VISIT ADD ON: CPT | Mod: NC

## 2025-06-24 RX ORDER — FUROSEMIDE 20 MG/1
20 TABLET ORAL
Qty: 45 | Refills: 1 | Status: DISCONTINUED | COMMUNITY
Start: 2025-06-24 | End: 2025-06-24